# Patient Record
Sex: FEMALE | Race: OTHER | NOT HISPANIC OR LATINO
[De-identification: names, ages, dates, MRNs, and addresses within clinical notes are randomized per-mention and may not be internally consistent; named-entity substitution may affect disease eponyms.]

---

## 2017-02-14 ENCOUNTER — APPOINTMENT (OUTPATIENT)
Dept: OPHTHALMOLOGY | Facility: CLINIC | Age: 82
End: 2017-02-14

## 2017-02-28 ENCOUNTER — APPOINTMENT (OUTPATIENT)
Dept: OPHTHALMOLOGY | Facility: CLINIC | Age: 82
End: 2017-02-28

## 2017-02-28 PROBLEM — Z94.7 HISTORY OF CORNEAL TRANSPLANT: Status: ACTIVE | Noted: 2017-02-28

## 2017-06-29 ENCOUNTER — APPOINTMENT (OUTPATIENT)
Dept: OPHTHALMOLOGY | Facility: CLINIC | Age: 82
End: 2017-06-29

## 2017-08-22 ENCOUNTER — APPOINTMENT (OUTPATIENT)
Dept: OPHTHALMOLOGY | Facility: CLINIC | Age: 82
End: 2017-08-22
Payer: MEDICARE

## 2017-08-22 PROCEDURE — 92286 ANT SGM IMG I&R SPECLR MIC: CPT

## 2017-08-22 PROCEDURE — 92012 INTRM OPH EXAM EST PATIENT: CPT

## 2017-08-22 PROCEDURE — 92133 CPTRZD OPH DX IMG PST SGM ON: CPT

## 2017-08-22 RX ORDER — NEOMYCIN AND POLYMYXIN B SULFATES AND DEXAMETHASONE 3.5; 10000; 1 MG/G; [IU]/G; MG/G
3.5-10000-0.1 OINTMENT OPHTHALMIC
Qty: 1 | Refills: 5 | Status: ACTIVE | COMMUNITY
Start: 2017-08-22 | End: 1900-01-01

## 2017-12-14 ENCOUNTER — APPOINTMENT (OUTPATIENT)
Dept: OPHTHALMOLOGY | Facility: CLINIC | Age: 82
End: 2017-12-14

## 2018-03-29 ENCOUNTER — APPOINTMENT (OUTPATIENT)
Dept: OPHTHALMOLOGY | Facility: CLINIC | Age: 83
End: 2018-03-29
Payer: MEDICARE

## 2018-03-29 PROCEDURE — 92134 CPTRZ OPH DX IMG PST SGM RTA: CPT

## 2018-03-29 PROCEDURE — 92014 COMPRE OPH EXAM EST PT 1/>: CPT

## 2018-03-29 PROCEDURE — 92286 ANT SGM IMG I&R SPECLR MIC: CPT

## 2018-07-26 ENCOUNTER — APPOINTMENT (OUTPATIENT)
Dept: OPHTHALMOLOGY | Facility: CLINIC | Age: 83
End: 2018-07-26
Payer: MEDICARE

## 2018-07-26 PROCEDURE — ZZZZZ: CPT

## 2018-07-26 PROCEDURE — 92012 INTRM OPH EXAM EST PATIENT: CPT

## 2018-07-26 PROCEDURE — 92083 EXTENDED VISUAL FIELD XM: CPT

## 2018-07-26 PROCEDURE — 92250 FUNDUS PHOTOGRAPHY W/I&R: CPT

## 2018-07-26 PROCEDURE — 92286 ANT SGM IMG I&R SPECLR MIC: CPT

## 2019-02-07 ENCOUNTER — APPOINTMENT (OUTPATIENT)
Dept: OPHTHALMOLOGY | Facility: CLINIC | Age: 84
End: 2019-02-07
Payer: MEDICARE

## 2019-02-07 DIAGNOSIS — H52.4 MYOPIA, BILATERAL: ICD-10-CM

## 2019-02-07 DIAGNOSIS — H52.203 MYOPIA, BILATERAL: ICD-10-CM

## 2019-02-07 DIAGNOSIS — H52.13 MYOPIA, BILATERAL: ICD-10-CM

## 2019-02-07 PROCEDURE — 92134 CPTRZ OPH DX IMG PST SGM RTA: CPT

## 2019-02-07 PROCEDURE — 92015 DETERMINE REFRACTIVE STATE: CPT

## 2019-02-07 PROCEDURE — 92014 COMPRE OPH EXAM EST PT 1/>: CPT

## 2019-02-07 PROCEDURE — 92286 ANT SGM IMG I&R SPECLR MIC: CPT

## 2019-06-13 ENCOUNTER — APPOINTMENT (OUTPATIENT)
Dept: OPHTHALMOLOGY | Facility: CLINIC | Age: 84
End: 2019-06-13
Payer: MEDICARE

## 2019-06-13 DIAGNOSIS — H40.009 PREGLAUCOMA, UNSPECIFIED, UNSPECIFIED EYE: ICD-10-CM

## 2019-06-13 DIAGNOSIS — H18.231 SECONDARY CORNEAL EDEMA, RIGHT EYE: ICD-10-CM

## 2019-06-13 DIAGNOSIS — H35.3131 NONEXUDATIVE AGE-RELATED MACULAR DEGENERATION, BILATERAL, EARLY DRY STAGE: ICD-10-CM

## 2019-06-13 PROCEDURE — 92012 INTRM OPH EXAM EST PATIENT: CPT

## 2025-03-16 ENCOUNTER — INPATIENT (INPATIENT)
Facility: HOSPITAL | Age: 89
LOS: 1 days | Discharge: HOME | End: 2025-03-18
Attending: STUDENT IN AN ORGANIZED HEALTH CARE EDUCATION/TRAINING PROGRAM | Admitting: STUDENT IN AN ORGANIZED HEALTH CARE EDUCATION/TRAINING PROGRAM
Payer: MEDICARE

## 2025-03-16 VITALS
HEIGHT: 59 IN | OXYGEN SATURATION: 95 % | SYSTOLIC BLOOD PRESSURE: 165 MMHG | DIASTOLIC BLOOD PRESSURE: 83 MMHG | TEMPERATURE: 98 F | RESPIRATION RATE: 16 BRPM | WEIGHT: 110.01 LBS | HEART RATE: 59 BPM

## 2025-03-16 DIAGNOSIS — I10 ESSENTIAL (PRIMARY) HYPERTENSION: ICD-10-CM

## 2025-03-16 DIAGNOSIS — Z29.9 ENCOUNTER FOR PROPHYLACTIC MEASURES, UNSPECIFIED: ICD-10-CM

## 2025-03-16 DIAGNOSIS — E78.5 HYPERLIPIDEMIA, UNSPECIFIED: ICD-10-CM

## 2025-03-16 DIAGNOSIS — R29.810 FACIAL WEAKNESS: ICD-10-CM

## 2025-03-16 DIAGNOSIS — R56.9 UNSPECIFIED CONVULSIONS: ICD-10-CM

## 2025-03-16 DIAGNOSIS — R55 SYNCOPE AND COLLAPSE: ICD-10-CM

## 2025-03-16 LAB
ALBUMIN SERPL ELPH-MCNC: 4 G/DL — SIGNIFICANT CHANGE UP (ref 3.3–5)
ALP SERPL-CCNC: 60 U/L — SIGNIFICANT CHANGE UP (ref 40–120)
ALT FLD-CCNC: 20 U/L — SIGNIFICANT CHANGE UP (ref 4–33)
ANION GAP SERPL CALC-SCNC: 12 MMOL/L — SIGNIFICANT CHANGE UP (ref 7–14)
APPEARANCE UR: CLEAR — SIGNIFICANT CHANGE UP
APTT BLD: 30 SEC — SIGNIFICANT CHANGE UP (ref 24.5–35.6)
AST SERPL-CCNC: 37 U/L — HIGH (ref 4–32)
BACTERIA # UR AUTO: NEGATIVE /HPF — SIGNIFICANT CHANGE UP
BASOPHILS # BLD AUTO: 0.03 K/UL — SIGNIFICANT CHANGE UP (ref 0–0.2)
BASOPHILS NFR BLD AUTO: 0.3 % — SIGNIFICANT CHANGE UP (ref 0–2)
BILIRUB SERPL-MCNC: 0.4 MG/DL — SIGNIFICANT CHANGE UP (ref 0.2–1.2)
BILIRUB UR-MCNC: NEGATIVE — SIGNIFICANT CHANGE UP
BUN SERPL-MCNC: 25 MG/DL — HIGH (ref 7–23)
CALCIUM SERPL-MCNC: 10.2 MG/DL — SIGNIFICANT CHANGE UP (ref 8.4–10.5)
CAST: 6 /LPF — HIGH (ref 0–4)
CHLORIDE SERPL-SCNC: 102 MMOL/L — SIGNIFICANT CHANGE UP (ref 98–107)
CO2 SERPL-SCNC: 24 MMOL/L — SIGNIFICANT CHANGE UP (ref 22–31)
COLOR SPEC: YELLOW — SIGNIFICANT CHANGE UP
CREAT SERPL-MCNC: 0.93 MG/DL — SIGNIFICANT CHANGE UP (ref 0.5–1.3)
DIFF PNL FLD: NEGATIVE — SIGNIFICANT CHANGE UP
EGFR: 59 ML/MIN/1.73M2 — LOW
EGFR: 59 ML/MIN/1.73M2 — LOW
EOSINOPHIL # BLD AUTO: 0.02 K/UL — SIGNIFICANT CHANGE UP (ref 0–0.5)
EOSINOPHIL NFR BLD AUTO: 0.2 % — SIGNIFICANT CHANGE UP (ref 0–6)
GLUCOSE SERPL-MCNC: 133 MG/DL — HIGH (ref 70–99)
GLUCOSE UR QL: NEGATIVE MG/DL — SIGNIFICANT CHANGE UP
IANC: 7.5 K/UL — HIGH (ref 1.8–7.4)
IMM GRANULOCYTES NFR BLD AUTO: 0.4 % — SIGNIFICANT CHANGE UP (ref 0–0.9)
INR BLD: 1.03 RATIO — SIGNIFICANT CHANGE UP (ref 0.85–1.16)
KETONES UR-MCNC: NEGATIVE MG/DL — SIGNIFICANT CHANGE UP
LEUKOCYTE ESTERASE UR-ACNC: ABNORMAL
LYMPHOCYTES # BLD AUTO: 0.92 K/UL — LOW (ref 1–3.3)
LYMPHOCYTES # BLD AUTO: 10.2 % — LOW (ref 13–44)
MCHC RBC-ENTMCNC: 27.3 PG — SIGNIFICANT CHANGE UP (ref 27–34)
MCHC RBC-ENTMCNC: 33.4 G/DL — SIGNIFICANT CHANGE UP (ref 32–36)
MCV RBC AUTO: 81.6 FL — SIGNIFICANT CHANGE UP (ref 80–100)
MONOCYTES # BLD AUTO: 0.52 K/UL — SIGNIFICANT CHANGE UP (ref 0–0.9)
MONOCYTES NFR BLD AUTO: 5.8 % — SIGNIFICANT CHANGE UP (ref 2–14)
NEUTROPHILS # BLD AUTO: 7.5 K/UL — HIGH (ref 1.8–7.4)
NEUTROPHILS NFR BLD AUTO: 83.1 % — HIGH (ref 43–77)
NITRITE UR-MCNC: NEGATIVE — SIGNIFICANT CHANGE UP
NRBC # BLD AUTO: 0 K/UL — SIGNIFICANT CHANGE UP (ref 0–0)
NRBC # FLD: 0 K/UL — SIGNIFICANT CHANGE UP (ref 0–0)
PH UR: 6.5 — SIGNIFICANT CHANGE UP (ref 5–8)
PLATELET # BLD AUTO: 203 K/UL — SIGNIFICANT CHANGE UP (ref 150–400)
POTASSIUM SERPL-MCNC: 4.8 MMOL/L — SIGNIFICANT CHANGE UP (ref 3.5–5.3)
POTASSIUM SERPL-SCNC: 4.8 MMOL/L — SIGNIFICANT CHANGE UP (ref 3.5–5.3)
PROT SERPL-MCNC: 7.1 G/DL — SIGNIFICANT CHANGE UP (ref 6–8.3)
PROT UR-MCNC: SIGNIFICANT CHANGE UP MG/DL
PROTHROM AB SERPL-ACNC: 11.9 SEC — SIGNIFICANT CHANGE UP (ref 9.9–13.4)
RBC # BLD: 5.17 M/UL — SIGNIFICANT CHANGE UP (ref 3.8–5.2)
RBC # FLD: 13.6 % — SIGNIFICANT CHANGE UP (ref 10.3–14.5)
RBC CASTS # UR COMP ASSIST: 1 /HPF — SIGNIFICANT CHANGE UP (ref 0–4)
REVIEW: SIGNIFICANT CHANGE UP
SODIUM SERPL-SCNC: 138 MMOL/L — SIGNIFICANT CHANGE UP (ref 135–145)
SP GR SPEC: 1.04 — HIGH (ref 1–1.03)
SQUAMOUS # UR AUTO: 1 /HPF — SIGNIFICANT CHANGE UP (ref 0–5)
TROPONIN T, HIGH SENSITIVITY RESULT: 9 NG/L — SIGNIFICANT CHANGE UP
UROBILINOGEN FLD QL: 1 MG/DL — SIGNIFICANT CHANGE UP (ref 0.2–1)
WBC # BLD: 9.03 K/UL — SIGNIFICANT CHANGE UP (ref 3.8–10.5)
WBC # FLD AUTO: 9.03 K/UL — SIGNIFICANT CHANGE UP (ref 3.8–10.5)
WBC UR QL: 7 /HPF — HIGH (ref 0–5)

## 2025-03-16 PROCEDURE — 70450 CT HEAD/BRAIN W/O DYE: CPT | Mod: 26,XU

## 2025-03-16 PROCEDURE — 70498 CT ANGIOGRAPHY NECK: CPT | Mod: 26

## 2025-03-16 PROCEDURE — 0042T: CPT

## 2025-03-16 PROCEDURE — 99285 EMERGENCY DEPT VISIT HI MDM: CPT

## 2025-03-16 PROCEDURE — 70496 CT ANGIOGRAPHY HEAD: CPT | Mod: 26

## 2025-03-16 PROCEDURE — 71045 X-RAY EXAM CHEST 1 VIEW: CPT | Mod: 26

## 2025-03-16 PROCEDURE — 99223 1ST HOSP IP/OBS HIGH 75: CPT

## 2025-03-16 RX ORDER — MELATONIN 5 MG
3 TABLET ORAL ONCE
Refills: 0 | Status: COMPLETED | OUTPATIENT
Start: 2025-03-16 | End: 2025-03-16

## 2025-03-16 RX ORDER — ROSUVASTATIN CALCIUM 20 MG/1
1 TABLET, FILM COATED ORAL
Refills: 0 | DISCHARGE

## 2025-03-16 RX ORDER — ATORVASTATIN CALCIUM 80 MG/1
40 TABLET, FILM COATED ORAL AT BEDTIME
Refills: 0 | Status: DISCONTINUED | OUTPATIENT
Start: 2025-03-16 | End: 2025-03-16

## 2025-03-16 RX ORDER — ROSUVASTATIN CALCIUM 20 MG/1
40 TABLET, FILM COATED ORAL AT BEDTIME
Refills: 0 | Status: DISCONTINUED | OUTPATIENT
Start: 2025-03-16 | End: 2025-03-18

## 2025-03-16 RX ORDER — ASPIRIN 325 MG
300 TABLET ORAL DAILY
Refills: 0 | Status: DISCONTINUED | OUTPATIENT
Start: 2025-03-16 | End: 2025-03-17

## 2025-03-16 RX ORDER — CYANOCOBALAMIN 1000 UG/ML
0 INJECTION INTRAMUSCULAR; SUBCUTANEOUS
Refills: 0 | DISCHARGE

## 2025-03-16 RX ORDER — CALCIUM CARBONATE/VITAMIN D3 500MG-5MCG
0 TABLET ORAL
Refills: 0 | DISCHARGE

## 2025-03-16 RX ORDER — ENOXAPARIN SODIUM 100 MG/ML
40 INJECTION SUBCUTANEOUS EVERY 24 HOURS
Refills: 0 | Status: DISCONTINUED | OUTPATIENT
Start: 2025-03-16 | End: 2025-03-18

## 2025-03-16 RX ORDER — LOSARTAN POTASSIUM 100 MG/1
100 TABLET, FILM COATED ORAL DAILY
Refills: 0 | Status: DISCONTINUED | OUTPATIENT
Start: 2025-03-16 | End: 2025-03-18

## 2025-03-16 RX ADMIN — ROSUVASTATIN CALCIUM 40 MILLIGRAM(S): 20 TABLET, FILM COATED ORAL at 23:50

## 2025-03-16 RX ADMIN — Medication 3 MILLIGRAM(S): at 23:50

## 2025-03-16 NOTE — H&P ADULT - NSHPLABSRESULTS_GEN_ALL_CORE
LABS:                          14.1   9.03  )-----------( 203      ( 16 Mar 2025 16:18 )             42.2     03-16    138  |  102  |  25[H]  ----------------------------<  133[H]  4.8   |  24  |  0.93    Ca    10.2      16 Mar 2025 16:18    TPro  7.1  /  Alb  4.0  /  TBili  0.4  /  DBili  x   /  AST  37[H]  /  ALT  20  /  AlkPhos  60  03-16    PT/INR - ( 16 Mar 2025 16:18 )   PT: 11.9 sec;   INR: 1.03 ratio         PTT - ( 16 Mar 2025 16:18 )  PTT:30.0 sec

## 2025-03-16 NOTE — STROKE CODE NOTE - IV ALTEPLASE EXCLUSION ABS OTHER
no measurable focal neurological deficit, NIHSS 2 for baseline dementia and getting 2 questions wrong, symptoms most likely due to seizure like episode

## 2025-03-16 NOTE — H&P ADULT - HISTORY OF PRESENT ILLNESS
89F with PMH HTN, HLD, moderate/severe dementia who was  brought in to the ER due to an episode of loss of unresponsiveness transient right-sided facial droop.  The patient was a poor historian collateral history was taken from the daughter and the granddaughter at the bedside.  As per the family she was at her usual state of health around 1 PM today (March 16) then in between 1-1 30 she was washings something in the kitchen while standing, pain her granddaughter went to check on her and found her leaning towards the right side.  Then she started to fall towards the right side and the other family members came in and hold her and put her on the floor, she was not responding to her name calling only making some grunting noises.  She had urinary incontinence at the time, this episode of unresponsiveness lasted for about 5 minutes (?),  There was no shaking of the limbs or any twitching of the muscles the body, no tongue bite.  The family noticed some drooping on the right side of the face which lasted for about 2 to 3 minutes then resolved.  After the episode the patient was lethargic and confused for another 4 to 5 minutes, by the time the EMS came into the house she was back to her baseline. No prior history of seizure-like activity or LOC, no recent hx fall or head trauma. 89F with PMH HTN, HLD, moderate/severe dementia who was  brought in to the ER due to an episode of loss of transient unresponsiveness and right-sided facial droop. History primarily obtained from the daughter and the granddaughter at the bedside who report patient was at her usual state of health around 1 PM today (March 16) when shortly afterwards no later than 1:30PM she was noted to be standing in the kitchen washing something by her granddaughter who found her leaning towards the right side. She started to fall towards the right side and the other family members came in and hold her and put her on the floor - patient did not hit her head or fall completely. She was unable to respond to her name and was noted to be making some grunting noises as well  as an episode of urinary incontinence. The episode of unresponsiveness lasted for about 5 minutes and family denies shaking of the limbs or any twitching of the muscles the body or tongue bite. The family noticed some drooping on the right side of the face which lasted for about 2 to 3 minutes then resolved.  After the episode the patient was lethargic and confused for another 4 to 5 minutes, by the time the EMS came into the house she was back to her baseline. No prior history of seizure-like activity or LOC, no recent hx fall or head trauma.    In the ED code stroke was called and neuro was consulted. CT imaging was negative for acute pathology and patient did not have any further episodes of unresponsiveness or AMS. Labs unremarkable, trop 59, CXR, UA clear, VS notable for hypertension 158/90

## 2025-03-16 NOTE — CONSULT NOTE ADULT - SUBJECTIVE AND OBJECTIVE BOX
Neurology - Consult Note    -  Spectra: [id number] ([facility]), [id number] ([facility])  -    HPI: Patient BAKARI GAUTHIER is a 89y (1935) wo/man with a PMHx significant for ***      Review of Systems:  INCOMPLETE   CONSTITUTIONAL: No fevers or chills  EYES AND ENT: No visual changes or no throat pain   NECK: No pain or stiffness  RESPIRATORY: No hemoptysis or shortness of breath  CARDIOVASCULAR: No chest pain or palpitations  GASTROINTESTINAL: No melena or hematochezia  GENITOURINARY: No dysuria or hematuria  NEUROLOGICAL: +As stated in HPI above  SKIN: No itching, burning, rashes, or lesions   All other review of systems is negative unless indicated above.    Allergies:  No Known Allergies      PMHx/PSHx/Family Hx: As above, otherwise see below   HLD (Hyperlipidemia)    HTN - Hypertension        Social Hx:  No current use of tobacco, alcohol, or illicit drugs  Lives with ***    Medications:  MEDICATIONS  (STANDING):    MEDICATIONS  (PRN):      Vitals:  T(C): 36.6 (03-16-25 @ 15:37), Max: 36.6 (03-16-25 @ 15:37)  HR: 59 (03-16-25 @ 15:37) (59 - 59)  BP: 165/83 (03-16-25 @ 15:37) (165/83 - 165/83)  RR: 16 (03-16-25 @ 15:37) (16 - 16)  SpO2: 95% (03-16-25 @ 15:37) (95% - 95%)    Physical Examination: INCOMPLETE  General - NAD, pleasant, cooperative   Cardiovascular - Peripheral pulses palpable, no edema  Neurologic Exam:  Mental status - Awake, Alert, Oriented to person, place, and time. Speech fluent, repetition and naming intact. Follows simple and complex commands. Attention/concentration, recent and remote memory (including registration and recall), and fund of knowledge intact    Cranial nerves:  CN II: Visual fields are full to confrontation. Fundoscopic exam not done. Pupils are 4 mm and briskly reactive to light.   CN III, IV, VI: EOMI, no nystagmus, no ptosis  CN V: Facial sensation is intact to light touch in all 3 divisions bilaterally.  CN VII: Face is symmetric with normal eye closure and smile.  CN VII: Hearing is normal to rubbing fingers  CN IX, X: Palate elevates symmetrically. Phonation is normal.  CN XI: Head turning and shoulder shrug are intact  CN XII: Tongue is midline with normal movements and no atrophy.    Motor - Normal bulk and tone throughout. No pronator drift of out-stretched arms.  Strength testing          R/L:   Deltoid(C5) 5/5  Biceps(C6) 5/5   Triceps(C7) 5/5    Wrist Extension 5/5   Wrist Flexion (C8) 5/5    Interossei  (T1) 5/5     5/5                      R/L: Hip Flexion(L2/3) 5/5   Hip Extension (L4/5) 5/5  Knee Flexion (L4/5/S1) 5/5   Knee Extension (L3/4) 5/5  Dorsiflexion (L4/5) 5/5  Plantar Flexion (S1) 5/5  Sensation - Light touch,  pain, vibration and position sense intact throughout    DTR's -             Biceps      Triceps     Brachioradialis      Patellar    Ankle    Toes/plantar response  R             2+             2+                  2+                       2+            2+                 Down  L              2+             2+                 2+                        2+           2+                 Down    Coordination - Rapid alternating movements and fine finger movements are intact. There is no dysmetria on finger-to-nose and heel-knee-shin. There are no abnormal or extraneous movements. Romberg?    Gait and station - Posture is normal. Gait is steady with normal steps, base, arm swing, and turning. Heel and toe walking are normal. Tandem gait is normal       Labs:                        14.1   9.03  )-----------( 203      ( 16 Mar 2025 16:18 )             42.2           CAPILLARY BLOOD GLUCOSE      POCT Blood Glucose.: 125 mg/dL (16 Mar 2025 15:47)        PT/INR - ( 16 Mar 2025 16:18 )   PT: 11.9 sec;   INR: 1.03 ratio         PTT - ( 16 Mar 2025 16:18 )  PTT:30.0 sec  CSF:                  Radiology:     Neurology - Consult Note    -  Spectra: [phone] ([facility]), [phone] ([facility])  -    HPI: Patient BKAARI GAUTHIER is a 89y (1935) right handed woman with a PMHx significant for HTN, HLD, moderate to severe dementia was brought in to the ER due to an episode of loss of unresponsiveness transient right-sided facial droop.  The patient was a poor historian collateral history was taken from the daughter and the granddaughter at the bedside.  As per the family she was at her usual state of health around 1 PM today (March 16) then in between 1-1 30 she was washings something in the kitchen while standing, pain her granddaughter went to check on her and found her leaning towards the right side.  Then she started to fall towards the right side and the other family members came in and hold her and put her on the floor, she was not responding to her name calling only making some grunting noises.  She had urinary incontinence at the time, this episode of unresponsiveness lasted for about 5 minutes (?),  There was no shaking of the limbs or any twitching of the muscles the body, no tongue bite.  The family noticed some drooping on the right side of the face which lasted for about 2 to 3 minutes then resolved.  After the episode the patient was lethargic and confused for another 4 to 5 minutes, by the time the EMS came into the house she was back to her baseline.  By the time the patient was in the ER she was back to her baseline without any new episodes of unresponsiveness.  The code stroke was called because of the reported altered mental status and right-sided facial droop.  The family denied any previous history of convulsion or any seizure-like activities are loss of consciousness.  Family denied any recent history of fall or head trauma.  There was no history of fever, chest pain, cough, shortness of breath, or headache.      Review of Systems:  completed with the help of family members at bedside   CONSTITUTIONAL: No fevers or chills  EYES AND ENT: No visual changes or no throat pain   NECK: No pain or stiffness  RESPIRATORY: No hemoptysis or shortness of breath  CARDIOVASCULAR: No chest pain or palpitations  GASTROINTESTINAL: No melena or hematochezia  GENITOURINARY: No dysuria or hematuria  NEUROLOGICAL: +As stated in HPI above  SKIN: No itching, burning, rashes, or lesions   All other review of systems is negative unless indicated above.    Allergies:  No Known Allergies      PMHx/PSHx/Family Hx: As above, otherwise see below   HLD (Hyperlipidemia)    HTN - Hypertension        Social Hx:  No current use of tobacco, alcohol, or illicit drugs  Lives with ***    Medications:  MEDICATIONS  (STANDING):    MEDICATIONS  (PRN):      Vitals:  T(C): 36.6 (03-16-25 @ 15:37), Max: 36.6 (03-16-25 @ 15:37)  HR: 59 (03-16-25 @ 15:37) (59 - 59)  BP: 165/83 (03-16-25 @ 15:37) (165/83 - 165/83)  RR: 16 (03-16-25 @ 15:37) (16 - 16)  SpO2: 95% (03-16-25 @ 15:37) (95% - 95%)    Physical Examination:  General - NAD, pleasant, cooperative   Cardiovascular - Peripheral pulses palpable, no edema  Neurologic Exam:  Mental status - Awake, Alert, Oriented to person, was able to tell she is in the hospital, unable to tell the date, month or year. Speech fluent, repetition and naming intact. Follows simple commands but unable to follow complex commands. Attention/concentration, recent and remote memory (including registration and recall), and fund of knowledge impaired    Cranial nerves:  CN II: Visual fields are full to confrontation. Fundoscopic exam not done. Pupils are 2 mm and sluggishly reactive to light.   CN III, IV, VI: EOMI, no nystagmus, no ptosis  CN V: Facial sensation is intact to light touch in all 3 divisions bilaterally.  CN VII: Face is symmetric with normal eye closure and smile.  CN VII: Hearing is normal to rubbing fingers  CN IX, X: Palate elevates symmetrically. Phonation is normal.  CN XI: Head turning and shoulder shrug are intact  CN XII: Tongue is midline with normal movements and no atrophy.    Motor - Normal bulk and tone throughout. No pronator drift of out-stretched arms.    Strength testing          Grossly 5/5 throughout, unable to assess fully due to inability to follow command and exert full strength    Sensation - Light touch intact throughout    DTR's -             Biceps      Triceps     Brachioradialis      Patellar    Ankle    Toes/plantar response  R         2+             2+                  2+              2+Brisk        2+                 Down  L          2+             2+                 2+              2+Brisk        2+                 Down    Coordination - Rapid alternating movements and fine finger movements are intact. There is no dysmetria on finger-to-nose but heel-knee-shin could not be assessed properly due to dementia. There are no abnormal or extraneous movements.   Romberg- not tested due to acute setup and safety concern    Gait and station - not tested due to acute setup and safety concern    Labs:                        14.1   9.03  )-----------( 203      ( 16 Mar 2025 16:18 )             42.2           CAPILLARY BLOOD GLUCOSE      POCT Blood Glucose.: 125 mg/dL (16 Mar 2025 15:47)        PT/INR - ( 16 Mar 2025 16:18 )   PT: 11.9 sec;   INR: 1.03 ratio         PTT - ( 16 Mar 2025 16:18 )  PTT:30.0 sec  CSF:            Radiology:  CTH:  No acute intracranial hemorrhage, mass effect, or midline shift.    CT PERFUSION:  Technical limitations: Perfusion scan was not diagnostic.    CTA NECK:  No evidence ofsignificant stenosis or occlusion.    CTA HEAD:  No large vessel occlusion, significant stenosis or vascular abnormality   identified.

## 2025-03-16 NOTE — ED ADULT TRIAGE NOTE - CHIEF COMPLAINT QUOTE
At 1:30 PM, patient washing fish in kitchen. Family went to check on her and noted patient "tilted" to right side like she was going to fall, so they brought patient down to ground safely. Family noted slurred speech and right facial droop, episode of incontinence. 911 was called and patient refused EMS. History HTN. MD Benjie called for stroke evaluation. Stroke code called.

## 2025-03-16 NOTE — ED PROVIDER NOTE - OBJECTIVE STATEMENT
89-year-old female PMH of dementia, HTN and HLD presents with AMS and strokelike symptoms beginning at 1 PM.  Family notes that she was standing at the counter cleaning fish when she started to.  That she was not acting like herself and she started falling to her right.  Family caught her and gently lowered her to the ground and for 5 minutes she was unresponsive.  She then had a 5-minute period where she appeared confused and had garbled speech and R facial droop. 5 minutes later she was back to her baseline.  She did lose control of her bladder during this time.  Family denies seeing any tremors or shakes.

## 2025-03-16 NOTE — H&P ADULT - PROBLEM SELECTOR PLAN 3
-pt with hx moderate/severe dementia  -was evaluated with EEG by neurologist Dr. Huff in 2023 and was recommended donepezil but was self-dc/d by family  -aspiration and fall precautions  -imaging as above, if negative can follow up with neuro outpatient.

## 2025-03-16 NOTE — H&P ADULT - NSHPPHYSICALEXAM_GEN_ALL_CORE
VITALS:   T(C): 36.4 (03-16-25 @ 19:00), Max: 36.6 (03-16-25 @ 15:37)  HR: 67 (03-16-25 @ 19:00) (59 - 91)  BP: 158/90 (03-16-25 @ 19:00) (158/90 - 190/135)  RR: 16 (03-16-25 @ 19:00) (16 - 20)  SpO2: 100% (03-16-25 @ 19:00) (95% - 100%)    GENERAL: NAD, lying in bed comfortably  HEAD:  Atraumatic, normocephalic  EYES: EOMI, PERRLA, conjunctiva and sclera clear  ENT: Moist mucous membranes  NECK: Supple, no JVD  HEART: Regular rate and rhythm, no murmurs, rubs, or gallops  LUNGS: Unlabored respirations.  Clear to auscultation bilaterally, no crackles, wheezing, or rhonchi  ABDOMEN: Soft, nontender, nondistended, +BS  EXTREMITIES: 2+ peripheral pulses bilaterally. No clubbing, cyanosis, or edema  NERVOUS SYSTEM:  A&Ox3, no focal deficits   SKIN: No rashes or lesions VITALS:   T(C): 36.4 (03-16-25 @ 19:00), Max: 36.6 (03-16-25 @ 15:37)  HR: 67 (03-16-25 @ 19:00) (59 - 91)  BP: 158/90 (03-16-25 @ 19:00) (158/90 - 190/135)  RR: 16 (03-16-25 @ 19:00) (16 - 20)  SpO2: 100% (03-16-25 @ 19:00) (95% - 100%)    GENERAL: NAD, lying in bed comfortably  HEAD:  Atraumatic, normocephalic  EYES: EOMI, PERRLA, conjunctiva and sclera clear  ENT: Moist mucous membranes  NECK: Supple, no JVD  HEART: Regular rate and rhythm, no murmurs, rubs, or gallops  LUNGS: Unlabored respirations.  Clear to auscultation bilaterally, no crackles, wheezing, or rhonchi  ABDOMEN: Soft, nontender, nondistended, +BS  EXTREMITIES: 2+ peripheral pulses bilaterally. No clubbing, cyanosis, or edema  NERVOUS SYSTEM:  A&Ox3, 4/5 strength in RUE/RLE, sensory fxn intact in all extremities. Per family patient has good long term memory and functional status but has difficulty with short term memory.  SKIN: No rashes or lesions

## 2025-03-16 NOTE — ED PROVIDER NOTE - CLINICAL SUMMARY MEDICAL DECISION MAKING FREE TEXT BOX
89-year-old female PMH of dementia, HTN and HLD presents with AMS and strokelike symptoms beginning at 1 PM where she started falling to her right.  Family caught her and gently lowered her to the ground and for 5 minutes she was unresponsive.  She then had a 5-minute period where she appeared confused and had garbled speech and R facial droop. 5 minutes later she was back to her baseline.  She did lose control of her bladder during this time.  Family denies seeing any tremors or shakes. Vitals are wnl. PE is unremarkable. Stoke code CT was negative for bleed or large occlusion. Neurology at bedside is suspicious for first time seizure and recommend admission for EEG/MRI. Will also get labs, cardiac work-up and UA to look for any underlying metabolic/infectious causes.

## 2025-03-16 NOTE — ED PROVIDER NOTE - PHYSICAL EXAMINATION
Const: Awake, alert, no acute distress.    HEENT: NC/AT.  Moist mucous membranes.  Eyes: Extraocular movements intact b/l.  No scleral icterus.  Neck: Full ROM without pain.  Cardiac: Extremities well perfused, normal coloration, no peripheral cyanosis.    Resp: Speaking in full sentences.  No evidence of respiratory distress.  Abd: Non-distended.  Skin: Normal coloration.  No rashes, abrasions or lacerations.  Neuro: Awake, alert & oriented x 2 (at baseline):.  Moves all extremities symmetrically.  No obvious focal deficits. No dysmetria. No pronator drift. CN2-12 grossly intact.

## 2025-03-16 NOTE — ED ADULT NURSE NOTE - OBJECTIVE STATEMENT
89 year old female brought to ED by family c/o weakness while performing ADLs. Family reports pt was leaning to the right side slurring her speech, and sat down, family lowered her to the floor and was unresponsive for 4-5 minutes. Upon arousal, family states pt was lethargic. Family states when EMS arrived, pt was at baseline. Family states pt has a past medical hx of dementia. Pt denies headache, hitting her head, use of blood thinners, nausea, dizziness, urinary symptoms, or any pain at this time.    AxOx3 and ambulatory x 1 at baseline. RR even and unlabored on room air. Lung sounds clear to auscultation in all fields. S1 and S2 noted, rate and rhythm regular, pt on cardiac monitor, NSR. Abdomen soft, nondistended, and nontender. Normoactive bowel sounds heard in all 4 quadrants. Safety maintained. 20G IV placed in right AC.     AxOx3 and ambulatory x 1 assist at baseline. Neurologically intact. Pt speaking in full sentences. RR even and unlabored on room air. Lung sounds clear to auscultation in all lung fields. S1 and S2 noted, rate and rhythm regular, pt on cardiac monitor, NSR. Abdomen soft, nondistended, and nontender, normoactive bowel sounds heard in all 4 quadrants. 20G IV placed in right forearm. Safety maintained.

## 2025-03-16 NOTE — H&P ADULT - NSHPSOCIALHISTORY_GEN_ALL_CORE
Denies smoking/alcohol use. Ambulatory independently while in house, uses cane while outside. Lives with family at home.

## 2025-03-16 NOTE — ED ADULT NURSE REASSESSMENT NOTE - NS ED NURSE REASSESS COMMENT FT1
Received report from Alondra BENTON. Pt awake and alert, resp even and unlabored. Pt in no acute distress at this time. Pt NSR on CM. Seizure precaution in place and suction set up. NIH score of 1. Pt has no deficits at this time .Denies CP, SOB, HA, dizziness, palpitations, blurry vision. Resting comfortably. Safety being maintained, plan of care ongoing.

## 2025-03-16 NOTE — ED PROVIDER NOTE - WR ORDER ID 1
depression and bipolar disorder, 7 years ago without any beneficial effect  Mental health contact: Psychiatrist in [facility] manages patient's psychotropic medications  Head trauma: Denies    Allergies:  Dye [gadolinium derivatives]    Mental Status  Appearance: Appropriately groomed and in hospital attire. Made good eye contact.   Behavior: Slightly anxious, cooperative with interview, socially appropriate.  No psychomotor retardation or agitation appreciated.  Gait was not evaluated, as patient was sitting on his bed.   Speech: Normal in tone, volume, and quality.  No pressured speech noted  Mood: \"Anxious\"   Affect: Mood congruent. Range is full  Thought Process: Appears linear and goal oriented.   Thought Content: Patient does not have any current active suicidal and homicidal ideations. No overt delusions or paranoia appreciated.   Perceptions: Seems patient does not have any auditory or visual hallucinations at present time. Patient did not appear to be responding to internal stimuli. No overt psychosis.   Executive Functions: Appear mildly impaired.   Concentration: Seems fair  Reasoning: Appears impaired based on interaction from interview   Orientation: to person, place, date, and situation.   Alert.   Language: Intact.   Fund of information: Intact.   Memory: recent and remote appear intact.   Impulsivity: Questionable  Neurovegitative: Good appetite, fair sleep  Insight: Limited  Judgment: Limited    Assessment    DSM 5 DIAGNOSIS:  Mood disorder, unspecified  Anxiety, unspecified  History of depression  History of bipolar depression  Status post unintentional overdose on prescribed medication    Medical conditions pertinent to the patient's mental health  Coronary artery disease  Hyperlipidemia  Hypertension  Obesity  COPD  Sleep apnea  Chronic pain   Irritable bowel syndrome    Recommendations  1. Currently patient is not suicidal, homicidal or psychotic.  Patient adamantly denies intentional 
[id number]

## 2025-03-16 NOTE — ED ADULT TRIAGE NOTE - AVIAN FLU SYMPTOMS
[FreeTextEntry1] : 58M 4mos s/p L TKA  Continue HEP Activities as tolerated return 1 year   We discussed the patient's progress and they were reminded of their antibiotic prophylaxis. We discussed continued physical therapy and/or a home exercise program. Questions about their knee replacement and future follow up were answered and discussed.    
No

## 2025-03-16 NOTE — CONSULT NOTE ADULT - ATTENDING COMMENTS
DOS 3/17/25    89yo F with PMHx significant for HTN, HLD, moderate to severe dementia was brought in to the ER due to an episode of loss of unresponsiveness transient right-sided facial droop, found to be leaning to her right, along with nonsensical speech and urinary incontinence.   Strkoe code called on ED evaluation  Not tnk candidate as at baseline per ED eval  No LVO on CTA  Imaging without notable findings  Currently undergoing eeg with daughter at bedside, AAOx1, no other clear focality    Syncope vs first time seizure  f/u eeg  b12, folate, tsh all wnl   no need for stroke workup at tis time  tele  check tte for syncope  was on donepezil at home but stopped   has baseline severe dementia - will anticipate dc if eeg negative

## 2025-03-16 NOTE — CONSULT NOTE ADULT - ASSESSMENT
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%), Small vessel disease, Embolic stroke of undetermined source, Stroke of other determined etiology    Recommendations:  [] Atorvastatin 40-80 mg daily titrated per LDL target based on ASCVD score  [] HgbA1C, fasting lipid panel, CBC, CMP, coag panel, troponin  [] check UA, TSH, T3/T4, vitamin B1, B6, B12, folate, lactate, creatnine kinase  [] MRI brain w/o con,   [] TTE w/o bubble study  [] telemetry to check for arrhythmia, EKG, will discuss loop recorder    - Tight glucose control (long-term goal HgbA1c < 6%)  - Stroke education and counseling  - Neuro-checks and VS q4h  - BP target normotensive, <130/80 mm Hg  - Dysphagia screen. If fails, speech/swallow eval  - aspiration, fall precautions  - STAT CT head non-contrast for change in neuro exam.   - PT/ OT / DVT ppx per primary team     Discussed with stroke  attending Dr. Deirdre Aguilar regarding decision against candidacy for Tenecteplase / thrombectomy. Will be formally staffed on morning rounds with attending. Recommendations will be complete once signed by attending. Assessment:  89yo F with PMHx significant for HTN, HLD, moderate to severe dementia was brought in to the ER due to an episode of loss of unresponsiveness transient right-sided facial droop.  The patient was a poor historian collateral history was taken from the daughter and the granddaughter at the bedside.  As per the family she was at her usual state of health around 1 PM today (March 16) then in between 1-1 30 she was washings something in the kitchen while standing, pain her granddaughter went to check on her and found her leaning towards the right side.  Then she started to fall towards the right side and the other family members came in and hold her and put her on the floor, she was not responding to her name calling only making some grunting noises.  She had urinary incontinence during that episode. This episode of unresponsiveness lasted for about 5 minutes (?),  There was no shaking of the limbs or any twitching of the muscles the body, no tongue bite.  Exam grossly non-focal, CT head and CTA head/neck unremarkable.   LKW: 1300 on 03/16/2025  NIHSS: 2 (gettign 2 questions wrong)  Baseline MRS: 2  Not a Teneceteplase candidate due to no measurable focal neurological deficit, NIHSS 2 due to baseline dementia, symptoms suggestive of a seizure like episode   Not a thrombectomy candidate due to no LVO    Impression: Episode of unresponsiveness with right sided facial droop and urinary incontinence followed by period of lethargy and confusion could be due to seizure on the background of moderate to severe dementia. Unlikely to be vascular in origin given non-focal exam, and unremarkable CT head and CTA head/neck. Needs to r/o toxic/metabolic encephalopathy as precipitating factor for the seizure like episode.     Recommendations:  [] Atorvastatin 40-80 mg daily titrated per LDL target based on ASCVD score  [] HgbA1C, fasting lipid panel, CBC, CMP, coag panel, troponin  [] check UA, TSH, T3/T4, vitamin B1, B6, B12, folate, lactate, creatnine kinase  [] MRI brain w/wo IV contrast   [] TTE w/o bubble study  [] telemetry to check for arrhythmia, EKG, will discuss loop recorder    - Tight glucose control (long-term goal HgbA1c < 6%)  - Stroke education and counseling  - Neuro-checks and VS q4h  - BP target normotensive, <130/80 mm Hg  - Dysphagia screen. If fails, speech/swallow eval  - aspiration, fall precautions  - STAT CT head non-contrast for change in neuro exam.   - PT/ OT / DVT ppx per primary team     Discussed with stroke  attending Dr. Deirdre Aguilar regarding decision against candidacy for Tenecteplase / thrombectomy. Will be formally staffed on morning rounds with attending. Recommendations will be complete once signed by attending. Assessment:  89yo F with PMHx significant for HTN, HLD, moderate to severe dementia was brought in to the ER due to an episode of loss of unresponsiveness transient right-sided facial droop.  The patient was a poor historian collateral history was taken from the daughter and the granddaughter at the bedside.  As per the family she was at her usual state of health around 1 PM today (March 16) then in between 1-1 30 she was washings something in the kitchen while standing, pain her granddaughter went to check on her and found her leaning towards the right side.  Then she started to fall towards the right side and the other family members came in and hold her and put her on the floor, she was not responding to her name calling only making some grunting noises.  She had urinary incontinence during that episode. This episode of unresponsiveness lasted for about 5 minutes (?),  There was no shaking of the limbs or any twitching of the muscles the body, no tongue bite.  Exam grossly non-focal, CT head and CTA head/neck unremarkable.   LKW: 1300 on 03/16/2025  NIHSS: 2 (gettign 2 questions wrong)  Baseline MRS: 2  Not a Teneceteplase candidate due to no measurable focal neurological deficit, NIHSS 2 due to baseline dementia, symptoms suggestive of a seizure like episode   Not a thrombectomy candidate due to no LVO    Impression: Episode of unresponsiveness with right sided facial droop and urinary incontinence followed by period of lethargy and confusion could be due to seizure on the background of moderate to severe dementia. Unlikely to be vascular in origin given non-focal exam, and unremarkable CT head and CTA head/neck. Needs to r/o toxic/metabolic encephalopathy as precipitating factor for the seizure like episode.     Recommendations:  [] Atorvastatin 40-80 mg daily titrated per LDL target based on ASCVD score  [] HgbA1C, fasting lipid panel, CBC, CMP, coag panel, troponin  [] check UA, TSH, T3/T4, vitamin B1, B6, B12, folate, lactate, creatnine kinase  [] MRI brain w/wo IV contrast   [] rEEG  [] TTE w/o bubble study  [] telemetry to check for arrhythmia, EKG, will discuss loop recorder    - Tight glucose control (long-term goal HgbA1c < 6%)  - Stroke education and counseling  - Neuro-checks and VS q4h  - BP target normotensive, <130/80 mm Hg  - Dysphagia screen. If fails, speech/swallow eval  - aspiration, fall precautions  - STAT CT head non-contrast for change in neuro exam.   - PT/ OT / DVT ppx per primary team     Discussed with stroke  attending Dr. Deirdre Aguilar regarding decision against candidacy for Tenecteplase / thrombectomy. Will be formally staffed on morning rounds with attending. Recommendations will be complete once signed by attending.

## 2025-03-16 NOTE — H&P ADULT - PROBLEM SELECTOR PLAN 2
-pt with R sided facial droop that resolved within 5 minutes after onset as per family  -pt. w/ R sided weakness 4/5 of UE/LE  -PT/OT consult ordered  -imaging as above

## 2025-03-16 NOTE — H&P ADULT - ASSESSMENT
89F with PMH HTN, HLD, moderate/severe dementia who was  brought in to the ER due to an episode of loss of unresponsiveness transient right-sided facial droop now admitted for CVA workup

## 2025-03-16 NOTE — ED PROVIDER NOTE - ATTENDING CONTRIBUTION TO CARE
I have discussed the patient's case presentation with resident. I have also personally performed a face-to-face evaluation of the patient. I agree with the resident's assessment and plan.    Stroke code activated by me from triage for transient dysarthria, possible LOC. DDx CVA vs seizure. CTA head/neck negative for stroke; neurology feels sx more likely seizure

## 2025-03-16 NOTE — H&P ADULT - PROBLEM SELECTOR PLAN 1
-pt noted to have transient unresponsiveness at home w/ R sided facial droop that resolved within appx. 5 minutes.  -no seizure like activity or tongue biting noted, patient noted to return to baseline upon EMS arrival to [place]  -[facility] [place] called in ED, neuro consulted, CT imaging negative and labs/UA/CXR unremarkable  -will give atorvastatin and obtain further labs as per neuro  -TTE and MR brain w/wo contrast  -fall precautions, PT/OT/DVT ppx  -dysphagia screen

## 2025-03-16 NOTE — ED ADULT NURSE NOTE - NSFALLHARMRISKINTERV_ED_ALL_ED
Assistance OOB with selected safe patient handling equipment if applicable/Assistance with ambulation/Communicate risk of Fall with Harm to all staff, patient, and family/Encourage patient to sit up slowly, dangle for a short time, stand at bedside before walking/Monitor gait and stability/Orthostatic vital signs/Provide visual cue: red socks, yellow wristband, yellow gown, etc/Reinforce activity limits and safety measures with patient and family/Bed in lowest position, wheels locked, appropriate side rails in place/Call bell, personal items and telephone in reach/Instruct patient to call for assistance before getting out of bed/chair/stretcher/Non-slip footwear applied when patient is off stretcher/[place] to call system/Physically safe environment - no spills, clutter or unnecessary equipment/Purposeful Proactive Rounding/Room/bathroom lighting operational, light cord in reach

## 2025-03-17 LAB
ANION GAP SERPL CALC-SCNC: 14 MMOL/L — SIGNIFICANT CHANGE UP (ref 7–14)
BUN SERPL-MCNC: 23 MG/DL — SIGNIFICANT CHANGE UP (ref 7–23)
CALCIUM SERPL-MCNC: 9.6 MG/DL — SIGNIFICANT CHANGE UP (ref 8.4–10.5)
CHLORIDE SERPL-SCNC: 101 MMOL/L — SIGNIFICANT CHANGE UP (ref 98–107)
CHOLEST SERPL-MCNC: 134 MG/DL — SIGNIFICANT CHANGE UP
CK SERPL-CCNC: 116 U/L — SIGNIFICANT CHANGE UP (ref 25–170)
CO2 SERPL-SCNC: 20 MMOL/L — LOW (ref 22–31)
CREAT SERPL-MCNC: 0.85 MG/DL — SIGNIFICANT CHANGE UP (ref 0.5–1.3)
CULTURE RESULTS: SIGNIFICANT CHANGE UP
EGFR: 66 ML/MIN/1.73M2 — SIGNIFICANT CHANGE UP
EGFR: 66 ML/MIN/1.73M2 — SIGNIFICANT CHANGE UP
GLUCOSE SERPL-MCNC: 91 MG/DL — SIGNIFICANT CHANGE UP (ref 70–99)
HDLC SERPL-MCNC: 65 MG/DL — SIGNIFICANT CHANGE UP
INR BLD: 0.97 RATIO — SIGNIFICANT CHANGE UP (ref 0.85–1.16)
LACTATE SERPL-SCNC: 1.2 MMOL/L — SIGNIFICANT CHANGE UP (ref 0.5–2)
LIPID PNL WITH DIRECT LDL SERPL: 53 MG/DL — SIGNIFICANT CHANGE UP
NON HDL CHOLESTEROL: 69 MG/DL — SIGNIFICANT CHANGE UP
POTASSIUM SERPL-MCNC: 5.3 MMOL/L — SIGNIFICANT CHANGE UP (ref 3.5–5.3)
POTASSIUM SERPL-SCNC: 5.3 MMOL/L — SIGNIFICANT CHANGE UP (ref 3.5–5.3)
SODIUM SERPL-SCNC: 135 MMOL/L — SIGNIFICANT CHANGE UP (ref 135–145)
SPECIMEN SOURCE: SIGNIFICANT CHANGE UP
T3 SERPL-MCNC: 96 NG/DL — SIGNIFICANT CHANGE UP (ref 80–200)
T4 AB SER-ACNC: 9.73 UG/DL — SIGNIFICANT CHANGE UP (ref 5.1–13)
TRIGL SERPL-MCNC: 83 MG/DL — SIGNIFICANT CHANGE UP
TSH SERPL-MCNC: 4.31 UIU/ML — HIGH (ref 0.27–4.2)
VIT B12 SERPL-MCNC: 1579 PG/ML — HIGH (ref 200–900)

## 2025-03-17 PROCEDURE — 99233 SBSQ HOSP IP/OBS HIGH 50: CPT

## 2025-03-17 PROCEDURE — 95720 EEG PHY/QHP EA INCR W/VEEG: CPT

## 2025-03-17 RX ORDER — MELATONIN 5 MG
3 TABLET ORAL ONCE
Refills: 0 | Status: COMPLETED | OUTPATIENT
Start: 2025-03-17 | End: 2025-03-17

## 2025-03-17 RX ORDER — ASPIRIN 325 MG
81 TABLET ORAL DAILY
Refills: 0 | Status: DISCONTINUED | OUTPATIENT
Start: 2025-03-17 | End: 2025-03-18

## 2025-03-17 RX ADMIN — ENOXAPARIN SODIUM 40 MILLIGRAM(S): 100 INJECTION SUBCUTANEOUS at 11:05

## 2025-03-17 RX ADMIN — Medication 3 MILLIGRAM(S): at 22:59

## 2025-03-17 RX ADMIN — LOSARTAN POTASSIUM 100 MILLIGRAM(S): 100 TABLET, FILM COATED ORAL at 06:42

## 2025-03-17 RX ADMIN — ROSUVASTATIN CALCIUM 40 MILLIGRAM(S): 20 TABLET, FILM COATED ORAL at 22:59

## 2025-03-17 RX ADMIN — Medication 81 MILLIGRAM(S): at 11:05

## 2025-03-17 NOTE — OCCUPATIONAL THERAPY INITIAL EVALUATION ADULT - NSOTDISCHREC_GEN_A_CORE
Recommend supervision/assist with functional activities which pt's daughter reports family can provide./No skilled OT needs

## 2025-03-17 NOTE — PROGRESS NOTE ADULT - PROBLEM SELECTOR PLAN 3
-pt with hx moderate/severe dementia  -was evaluated with EEG by neurologist Dr. Huff in 2023 and was recommended donepezil but was self-dc/d by family  -aspiration and fall precautions  -imaging as above, if negative can follow up with neuro outpatient. BP stable  - continue losartan

## 2025-03-17 NOTE — OCCUPATIONAL THERAPY INITIAL EVALUATION ADULT - GENERAL OBSERVATIONS, REHAB EVAL
Pt found supine in bed (+) tele in NAD, all lines intact (+) daughter present at bedside. Pt OK to be seen for OT per SERENITY Smith. HR 67 BPM.

## 2025-03-17 NOTE — PATIENT PROFILE ADULT - FALL HARM RISK - HARM RISK INTERVENTIONS
Assistance with ambulation/Assistance OOB with selected safe patient handling equipment/Communicate Risk of Fall with Harm to all staff/Monitor for mental status changes/Monitor gait and stability/Move patient closer to nurses' station/Reinforce activity limits and safety measures with patient and family/Reorient to person, place and time as needed/Review medications for side effects contributing to fall risk/Sit up slowly, dangle for a short time, stand at bedside before walking/Tailored Fall Risk Interventions/Toileting schedule using arm’s reach rule for commode and bathroom/Use of alarms - bed, chair and/or voice tab/Visual Cue: Yellow wristband and red socks/Bed in lowest position, wheels locked, appropriate side rails in place/Call bell, personal items and telephone in reach/Instruct patient to call for assistance before getting out of bed or chair/Non-slip footwear when patient is out of bed/[place] to call system/Physically safe environment - no spills, clutter or unnecessary equipment/Purposeful Proactive Rounding/Room/bathroom lighting operational, light cord in reach

## 2025-03-17 NOTE — PHYSICAL THERAPY INITIAL EVALUATION ADULT - GENERAL OBSERVATIONS, REHAB EVAL
Pt encountered in semisupine position, no distress, AxOx2, with +IV, +cardiac monitor, and daughter at bedside. Pt agreeable to participate in PT evaluation. Heart rate: 73bpm.

## 2025-03-17 NOTE — OCCUPATIONAL THERAPY INITIAL EVALUATION ADULT - PATIENT/FAMILY/SIGNIFICANT OTHER GOALS STATEMENT, OT EVAL
Pt with impaired cognitive status, did not state goal. Pt's daughter wants pt to feel better and improve balance.

## 2025-03-17 NOTE — PROGRESS NOTE ADULT - PROBLEM SELECTOR PLAN 2
-pt with R sided facial droop that resolved within 5 minutes after onset as per family  -pt. w/ R sided weakness 4/5 of UE/LE  -PT/OT consult ordered  -imaging as above moderate to severe  - previously on donepezil but self discontiued   - outpatient followup

## 2025-03-17 NOTE — PHYSICAL THERAPY INITIAL EVALUATION ADULT - PATIENT PROFILE REVIEW, REHAB EVAL
ACTIVITY ORDER: OOB with Assistance; Spoke with SERENITY Philip prior to PT evaluation-->Pt OK for PT consult/OOB activity./yes

## 2025-03-17 NOTE — OCCUPATIONAL THERAPY INITIAL EVALUATION ADULT - DIAGNOSIS, OT EVAL
Pt with impaired cognition and balance impacting ability to complete ADLs, IADLs, functional mobility/transfers.

## 2025-03-17 NOTE — PHYSICAL THERAPY INITIAL EVALUATION ADULT - ACTIVE RANGE OF MOTION EXAMINATION, REHAB EVAL
jasper. upper extremity Active ROM was WNL (within normal limits)/bilateral lower extremity Active ROM was WNL (within normal limits)

## 2025-03-17 NOTE — OCCUPATIONAL THERAPY INITIAL EVALUATION ADULT - SHORT TERM MEMORY, REHAB EVAL
Brief Interview for Mental Status Score (BIMS) difficult to assess at this time due to pt remaining minimally verbal throughout session and with impaired/inconsistent command follow/impaired

## 2025-03-17 NOTE — PHYSICAL THERAPY INITIAL EVALUATION ADULT - PERTINENT HX OF CURRENT PROBLEM, REHAB EVAL
89F with PMH HTN, HLD, moderate/severe dementia who was  brought in to the ER due to an episode of loss of unresponsiveness transient right-sided facial droop now admitted for CVA workup. CT BRAIN IMPRESSION: No acute intracranial hemorrhage, mass effect, or midline shift. CT PERFUSION: Technical limitations: Perfusion scan was not diagnostic. CTA NECK: No evidence of significant stenosis or occlusion. CTA HEAD: No large vessel occlusion, significant stenosis or vascular abnormality identified.

## 2025-03-17 NOTE — PROGRESS NOTE ADULT - PROBLEM SELECTOR PLAN 1
-pt noted to have transient unresponsiveness at home w/ R sided facial droop that resolved within appx. 5 minutes.  -no seizure like activity or tongue biting noted, patient noted to return to baseline upon EMS arrival to [place]  -[facility] [place] called in ED, neuro consulted, CT imaging negative and labs/UA/CXR unremarkable  -will give atorvastatin and obtain further labs as per neuro  -TTE and MR brain w/wo contrast  -fall precautions, PT/OT/DVT ppx  -dysphagia screen pt noted to have transient unresponsiveness at home w/ R sided facial droop that resolved within appx. 5 minutes.  - no seizure like activity or tongue biting noted, patient noted to return to baseline upon EMS arrival to [place]  - code stroke called in ED, neuro consulted, CT imaging negative and labs/UA/CXR unremarkable  - appreciate neuro recs: MRI/TTE/EEG pending  - continue aspirin, atorvastatin  - PT evaluation

## 2025-03-17 NOTE — OCCUPATIONAL THERAPY INITIAL EVALUATION ADULT - ADDITIONAL COMMENTS
Pt with impaired cognitive status; social history obtained from pt's daughter present at bedside. Pt lives in a private house with 4 steps to enter and a full flight inside the home to pt's bedroom with her daughter. Pt's bathroom has a tub with a shower chair. Pt was independent with all ADLs (requiring supervision for bathing) and ambulating "occasionally" with a cane but mostly without a device prior to admission.   Pt left supine in bed in NAD, all lines intact,  (+) alarm, call bell in reach, RN aware.

## 2025-03-17 NOTE — OCCUPATIONAL THERAPY INITIAL EVALUATION ADULT - PERTINENT HX OF CURRENT PROBLEM, REHAB EVAL
As per H&P: "89yo F with PMHx significant for HTN, HLD, moderate to severe dementia was brought in to the ER due to an episode of loss of unresponsiveness transient right-sided facial droop.  The patient was a poor historian collateral history was taken from the daughter and the granddaughter at the bedside.  As per the family she was at her usual state of health around 1 PM today (March 16) then in between 1-1 30 she was washings something in the kitchen while standing, pain her granddaughter went to check on her and found her leaning towards the right side.  Then she started to fall towards the right side and the other family members came in and hold her and put her on the floor, she was not responding to her name calling only making some grunting noises.  She had urinary incontinence during that episode. This episode of unresponsiveness lasted for about 5 minutes (?),  There was no shaking of the limbs or any twitching of the muscles the body, no tongue bite.  Exam grossly non-focal, CT head and CTA head/neck unremarkable. "    Admit dx: Convulsions, code stroke

## 2025-03-17 NOTE — PROGRESS NOTE ADULT - ASSESSMENT
89F with PMH HTN, HLD, moderate/severe dementia who was  brought in to the ER due to an episode of loss of unresponsiveness transient right-sided facial droop now admitted for CVA workup 89F with hx of dementia, HTN, HLD who presents after episode of loss of unresponsiveness transient right-sided facial droop, admitted for CVA workup.

## 2025-03-17 NOTE — OCCUPATIONAL THERAPY INITIAL EVALUATION ADULT - NS ASR FOLLOW COMMAND OT EVAL
Inconsistent command follow. Pt remained minimally verbal/50% of the time/able to follow single-step instructions

## 2025-03-17 NOTE — PATIENT PROFILE ADULT - NSPRESCRALCFREQ_GEN_A_NUR
[FreeTextEntry1] : Arthralgias of BL hands\par PE with no significant tenderness or synovitis\par \par Prior labs reviewed\par \par -NSAIDS/Tylenol prn for pain\par -trial with gabapentin\par -consider EMG/NCS\par \par Discussed treatment plan with the patient. The patient was given the opportunity to ask questions and all questions were answered to their satisfaction.\par 
Never

## 2025-03-17 NOTE — PHYSICAL THERAPY INITIAL EVALUATION ADULT - ADDITIONAL COMMENTS
Pt lives in a private house with her daughter with 4 steps to enter; (+)bilateral handrails; and a flight of stairs to negotiate to bedroom (~12 steps); (+)handrail. Prior to hospital admission, pt was ambulating independently using either no assistive device, single axis cane PRN, or holding onto furniture. Pt has had no recent falls, however pt's daughter states that they did have lower pt to floor yesterday as she was leaning towards right side.    Pt left comfortable in bed, NAD, all lines intact, all precautions maintained, with call bell  in reach, bed alarm on, daughter at bedside, and SERENITY Smith aware.

## 2025-03-17 NOTE — PHYSICAL THERAPY INITIAL EVALUATION ADULT - ORIENTATION, REHAB EVAL
Plastic Surgeon Procedure Text (A): After obtaining clear surgical margins the patient was sent to plastics for surgical repair.  The patient understands they will receive post-surgical care and follow-up from the referring physician's office. person/place

## 2025-03-18 ENCOUNTER — TRANSCRIPTION ENCOUNTER (OUTPATIENT)
Age: 89
End: 2025-03-18

## 2025-03-18 ENCOUNTER — RESULT REVIEW (OUTPATIENT)
Age: 89
End: 2025-03-18

## 2025-03-18 VITALS
OXYGEN SATURATION: 100 % | TEMPERATURE: 98 F | DIASTOLIC BLOOD PRESSURE: 95 MMHG | HEART RATE: 63 BPM | SYSTOLIC BLOOD PRESSURE: 146 MMHG | RESPIRATION RATE: 18 BRPM

## 2025-03-18 DIAGNOSIS — I50.32 CHRONIC DIASTOLIC (CONGESTIVE) HEART FAILURE: ICD-10-CM

## 2025-03-18 PROCEDURE — 93306 TTE W/DOPPLER COMPLETE: CPT | Mod: 26

## 2025-03-18 PROCEDURE — 99239 HOSP IP/OBS DSCHRG MGMT >30: CPT

## 2025-03-18 RX ORDER — LOSARTAN POTASSIUM 100 MG/1
1 TABLET, FILM COATED ORAL
Qty: 0 | Refills: 0 | DISCHARGE

## 2025-03-18 RX ORDER — AMLODIPINE BESYLATE 10 MG/1
5 TABLET ORAL DAILY
Refills: 0 | Status: DISCONTINUED | OUTPATIENT
Start: 2025-03-18 | End: 2025-03-18

## 2025-03-18 RX ADMIN — ENOXAPARIN SODIUM 40 MILLIGRAM(S): 100 INJECTION SUBCUTANEOUS at 10:36

## 2025-03-18 RX ADMIN — Medication 81 MILLIGRAM(S): at 12:04

## 2025-03-18 RX ADMIN — LOSARTAN POTASSIUM 100 MILLIGRAM(S): 100 TABLET, FILM COATED ORAL at 10:35

## 2025-03-18 NOTE — PROGRESS NOTE ADULT - PROBLEM SELECTOR PLAN 6
DVT ppx: lovenox  DIET: DASH  DISPO: Home. 37 mins spent dc planning. Discussed with daughter at bedside
Diet: NPO pending dysphagia screen, can restart DASH diet if passes  DVT: lovenox  Dispo: pending

## 2025-03-18 NOTE — DISCHARGE NOTE NURSING/CASE MANAGEMENT/SOCIAL WORK - FINANCIAL ASSISTANCE
[facility] provides services at a reduced cost to those who are determined to be eligible through [facility]’s financial assistance program. Information regarding [facility]’s financial assistance program can be found by going to [email or web address] or by calling [phone].

## 2025-03-18 NOTE — DISCHARGE NOTE PROVIDER - CARE PROVIDER_API CALL
Parag León  [facility]  [address] [place] [address]  Phone: [phone]  Fax: [phone]  Follow Up Time:    Parag León  Family Medicine  [address] [place] [address]  Phone: [phone]  Fax: [phone]  Follow Up Time:     Silvia Gomez  Internal Medicine  [address] [place] [address]  Phone: [phone]  Fax: [phone]  Follow Up Time:

## 2025-03-18 NOTE — DISCHARGE NOTE PROVIDER - HOSPITAL COURSE
89F with hx of dementia, HTN, HLD who presents after episode of loss of unresponsiveness transient right-sided facial droop, admitted for CVA workup. Patient was seen by neurology, suspect syncope vs. seizure. EEG was negative. TTE showed _______. Stable for discharge with outpatient followup. PT recommended home. 89F with hx of dementia, HTN, HLD who presents after episode of loss of unresponsiveness transient right-sided facial droop, admitted for CVA workup. Patient was seen by neurology, suspect syncope vs. seizure. EEG was negative. TTE showed mild diastolic dysfunction. Stable for discharge with outpatient followup. PT recommended home.     Transient loss of consciousness

## 2025-03-18 NOTE — DISCHARGE NOTE PROVIDER - NSDCCPTREATMENT_GEN_ALL_CORE_FT
PRINCIPAL PROCEDURE  Procedure: 2D echo  Findings and Treatment:   < end of copied text >  CONCLUSIONS:      1. Left ventricular cavity is normal in size. Left ventricular wall thickness is normal. Left ventricular systolic function is normal with an ejection fraction visually estimated at 65 to 70 %. There are no regional wall motion abnormalities seen.   2. There is mild (grade 1) left ventricular diastolic dysfunction.   3. Normal right ventricular cavity size and normal right ventricular systolic function. Tricuspid annular plane systolic excursion (TAPSE) is 2.1 cm (normal >=1.7 cm).   4. Structurally normal mitral valve with normal leaflet excursion. There is calcification of the mitral valve annulus. There is mild mitral regurgitation.< from: TTE W or WO Ultrasound Enhancing Agent (03.18.25 @ 13:01) >

## 2025-03-18 NOTE — DISCHARGE NOTE PROVIDER - PROVIDER TOKENS
PROVIDER:[TOKEN:[[id number]]] PROVIDER:[TOKEN:[[id number]]],PROVIDER:[TOKEN:[[id number]]]

## 2025-03-18 NOTE — DISCHARGE NOTE NURSING/CASE MANAGEMENT/SOCIAL WORK - NSDCPEFALRISK_GEN_ALL_CORE
For information on Fall & Injury Prevention, visit: [email or web address] OR  [email or web address] OR  https://www.cdc.gov/steadi/patient.html

## 2025-03-18 NOTE — PROGRESS NOTE ADULT - PROBLEM SELECTOR PLAN 1
pt noted to have transient unresponsiveness at home w/ R sided facial droop that resolved within appx. 5 minutes.  - no seizure like activity or tongue biting noted, patient noted to return to baseline upon EMS arrival to [place]  - code stroke called in ED, neuro consulted, CT imaging negative and labs/UA/CXR unremarkable  - appreciate neuro recs: suspect seizure vs. syncope, no need for MRI, follow up TTE/EEG  - EEG with no epileptiform activity seen - d/w neuro attg Dr. Machado ok for dc  - TTE with EF 65-70% - mild diastolic dysfunction - outpatient cardiology followup  - continue aspirin, atorvastatin  - PT recommended home

## 2025-03-18 NOTE — EEG REPORT - NS EEG TEXT BOX
[facility]   COMPREHENSIVE EPILEPSY CENTER   REPORT OF LONG-TERM VIDEO EEG     [facility]: [address], [place] [address], Ph#: [phone]  [facility] [address] [address] [place] [id number], Ph#: [phone]  [facility]: [address] [place] [address], Ph#: [phone]    Patient Name: BAKARI GAUTHIER  Age and : 89y ([birth year])  MRN #: [id number]  Location: [facility] [address]  Referring Physician: Kellee Couch    Start Time/Date: 12:32 on 3/17/2025  End Time/Date: 2:42 on 3/18/2025 (uninterpretable due to excess electrode artifact)  Duration: 14 hours 10 minutes    _____________________________________________________________  STUDY INFORMATION    EEG Recording Technique:  The patient underwent continuous Video-EEG monitoring, using Telemetry System hardware on the XLTek Digital System. EEG and video data were stored on a computer hard drive with important events saved in digital archive files. The material was reviewed by a physician (electroencephalographer / epileptologist) on a daily basis. Jose and seizure detection algorithms were utilized and reviewed. An EEG Technician attended to the patient, and was available throughout daytime work hours.  The epilepsy center neurologist was available in person or on call 24-hours per day.    EEG Placement and Labeling of Electrodes:  The EEG was performed utilizing 20 channel referential EEG connections (coronal over temporal over parasagittal montage) using all standard 10-20 electrode placements with EKG, with additional electrodes placed in the inferior temporal region using the modified 10-10 montage electrode placements for elective admissions, or if deemed necessary. Recording was at a sampling rate of 256 samples per second per channel. Time synchronized digital video recording was done simultaneously with EEG recording. A low light infrared camera was used for low light recording.     _____________________________________________________________  HISTORY    Patient is a 89y old  Female who presents with a chief complaint of R/o Stroke (17 Mar 2025 16:36)      PERTINENT MEDICATION:  MEDICATIONS  (STANDING):  aspirin enteric coated 81 milliGRAM(s) Oral daily  enoxaparin Injectable 40 milliGRAM(s) SubCutaneous every 24 hours  losartan 100 milliGRAM(s) Oral daily  rosuvastatin 40 milliGRAM(s) Oral at bedtime    _____________________________________________________________  STUDY INTERPRETATION      FINDINGS:      Background:  Continuity: continuous  Symmetry: symmetric  PDR: 7-8 Hz activity, with amplitude to 40 uV, that attenuated to eye opening.  Low amplitude frontal beta noted in wakefulness.  Reactivity: present  Voltage: normal, mostly 20-150uV  Anterior Posterior Gradient: present  Other background findings: none  Breach: absent    Background Slowing:  Generalized slowing: mild-moderate, with mixed alpha, theta, and delta activity during maximal wakefulness.  Focal slowing: intermittent left and right temporal delta activity.    State Changes:   -Drowsiness noted with increased slowing, attenuation of fast activity, vertex transients.  -Present with N2 sleep transients with symmetric spindles and K-complexes.    Sporadic Epileptiform Discharges:    None    Rhythmic and Periodic Patterns (RPPs):  None     Electrographic and Electroclinical seizures:  None    Other Clinical Events:  None    Activation Procedures:   -Hyperventilation was not performed.    -Photic stimulation was not performed.    Artifacts:  Intermittent myogenic and movement artifacts were noted.    ECG:  The heart rate on single channel ECG was uninterpretable.    Summary:  Abnormal EEG in the awake / drowsy / asleep state(s).  Mild-moderate generalized background slowing with superimposed bitemporal focal slowing.    Clinical Impression:  Diffuse cerebral dysfunction, maximal in the bilateral temporal regions.  There were no epileptiform abnormalities recorded.       -------------------------------------------------------------------------------------------------------    Maria T Hernandez MD  Director, Epilepsy - [facility]    Questions please call [phone]  After hours (5 PM - 8:30 AM) please call the epilepsy answering service at [phone]

## 2025-03-18 NOTE — DISCHARGE NOTE PROVIDER - NSDCFUADDAPPT_GEN_ALL_CORE_FT
APPTS ARE READY TO BE MADE: [X] YES  Best Family or Patient Contact (if needed):  Additional Information if needed:  [facility]      Other comments or requests:  APPTS ARE READY TO BE MADE: [X] YES  Best Family or Patient Contact (if needed):  Additional Information if needed:  1. Dr. Silvia Gomez Telehealth      Other comments or requests:       [facility] - Appointment was scheduled in [facility] for 3/25/25 at 10:40am with Dr. Silvia Gomez as TEB.    PCP - Patients daughter informed us they already have secured a follow up appointment which is not visible on [facility] and declined to provide appointment details.

## 2025-03-18 NOTE — DISCHARGE NOTE NURSING/CASE MANAGEMENT/SOCIAL WORK - PATIENT PORTAL LINK FT
You can access the FollowMyHealth Patient Portal offered by [facility] by registering at the following website: [email or web address]. By joining [facility]’s FollowMyHealth portal, you will also be able to view your health information using other applications (apps) compatible with our system.

## 2025-03-18 NOTE — PROGRESS NOTE ADULT - SUBJECTIVE AND OBJECTIVE BOX
Kellee Couch MD  [facility] Division of Hospital Medicine  Pager [phone] (M-F 8AM-5PM)  Other Times: [phone]    Patient is a 89y old  Female who presents with a chief complaint of R/o Stroke (18 Mar 2025 12:51)    SUBJECTIVE / OVERNIGHT EVENTS: no acute events overnight     MEDICATIONS  (STANDING):  aspirin enteric coated 81 milliGRAM(s) Oral daily  enoxaparin Injectable 40 milliGRAM(s) SubCutaneous every 24 hours  losartan 100 milliGRAM(s) Oral daily  rosuvastatin 40 milliGRAM(s) Oral at bedtime    MEDICATIONS  (PRN):      PHYSICAL EXAM:  Vital Signs Last 24 Hrs  T(C): 36.8 (18 Mar 2025 09:14), Max: 36.9 (17 Mar 2025 16:20)  T(F): 98.2 (18 Mar 2025 09:14), Max: 98.4 (17 Mar 2025 16:20)  HR: 63 (18 Mar 2025 09:14) (60 - 77)  BP: 146/95 (18 Mar 2025 09:14) (144/79 - 161/91)  BP(mean): --  RR: 18 (18 Mar 2025 09:14) (17 - 18)  SpO2: 100% (18 Mar 2025 09:14) (100% - 100%)    Parameters below as of 18 Mar 2025 09:14  Patient On (Oxygen Delivery Method): room air        CONSTITUTIONAL: resting in bed comfortably   RESPIRATORY: Normal respiratory effort; lungs are clear to auscultation bilaterally  CARDIOVASCULAR: Regular rate and rhythm, normal S1 and S2, no murmur/rub/gallop; No lower extremity edema  GASTROINTESTINAL: Nontender to palpation, normoactive bowel sounds, no rebound/guarding; No hepatosplenomegaly  MUSCULOSKELETAL:  no clubbing or cyanosis of digits; no joint swelling or tenderness to palpation  NEUROLOGY: non-focal; no gross sensory deficits   PSYCH: A+O to person, place, and time; affect appropriate  SKIN: No rashes; warm     LABS:                        14.1   9.03  )-----------( 203      ( 16 Mar 2025 16:18 )             42.2     03-17    135  |  101  |  23  ----------------------------<  91  5.3   |  20[L]  |  0.85    Ca    9.6      17 Mar 2025 06:37    TPro  7.1  /  Alb  4.0  /  TBili  0.4  /  DBili  x   /  AST  37[H]  /  ALT  20  /  AlkPhos  60  03-16    PT/INR - ( 17 Mar 2025 06:37 )   PT: 11.5 sec;   INR: 0.97 ratio         PTT - ( 17 Mar 2025 06:37 )  PTT:26.0 sec      Urinalysis Basic - ( 17 Mar 2025 06:37 )    Color: x / Appearance: x / SG: x / pH: x  Gluc: 91 mg/dL / Ketone: x  / Bili: x / Urobili: x   Blood: x / Protein: x / Nitrite: x   Leuk Esterase: x / RBC: x / WBC x   Sq Epi: x / Non Sq Epi: x / Bacteria: x        Culture - Urine (collected 16 Mar 2025 16:00)  Source: Clean Catch  Final Report (17 Mar 2025 21:21):    <10,000 CFU/mL Normal Urogenital Erika    Urinalysis with Rflx Culture (collected 16 Mar 2025 16:00)        RADIOLOGY & ADDITIONAL TESTS:  Results Reviewed:   Imaging Personally Reviewed:  Electrocardiogram Personally Reviewed:    COORDINATION OF CARE:  Care Discussed with Consultants/Other Providers [Y/N]:  Prior or Outpatient Records Reviewed [Y/N]:  
Kellee Couch MD  [facility] Division of Hospital Medicine  Pager [phone] (M-F 8AM-5PM)  Other Times: [phone]    Patient is a 89y old  Female who presents with a chief complaint of R/o Stroke (16 Mar 2025 20:48)    SUBJECTIVE / OVERNIGHT EVENTS: no acute events overnight     MEDICATIONS  (STANDING):  aspirin enteric coated 81 milliGRAM(s) Oral daily  enoxaparin Injectable 40 milliGRAM(s) SubCutaneous every 24 hours  losartan 100 milliGRAM(s) Oral daily  rosuvastatin 40 milliGRAM(s) Oral at bedtime    MEDICATIONS  (PRN):      PHYSICAL EXAM:  Vital Signs Last 24 Hrs  T(C): 36.9 (17 Mar 2025 16:20), Max: 36.9 (17 Mar 2025 16:20)  T(F): 98.4 (17 Mar 2025 16:20), Max: 98.4 (17 Mar 2025 16:20)  HR: 60 (17 Mar 2025 16:20) (60 - 72)  BP: 144/79 (17 Mar 2025 16:20) (143/60 - 160/140)  BP(mean): --  RR: 17 (17 Mar 2025 16:20) (16 - 18)  SpO2: 100% (17 Mar 2025 16:20) (99% - 100%)    Parameters below as of 17 Mar 2025 16:20  Patient On (Oxygen Delivery Method): room air        CONSTITUTIONAL: resting in bed comfortably   RESPIRATORY: Normal respiratory effort; lungs are clear to auscultation bilaterally  CARDIOVASCULAR: Regular rate and rhythm, normal S1 and S2, no murmur/rub/gallop; No lower extremity edema  GASTROINTESTINAL: Nontender to palpation, normoactive bowel sounds, no rebound/guarding; No hepatosplenomegaly  MUSCULOSKELETAL:  no clubbing or cyanosis of digits; no joint swelling or tenderness to palpation  NEUROLOGY: non-focal; no gross sensory deficits   PSYCH: A+O to person, place, and time; affect appropriate  SKIN: No rashes; warm     LABS:                        14.1   9.03  )-----------( 203      ( 16 Mar 2025 16:18 )             42.2     03-17    135  |  101  |  23  ----------------------------<  91  5.3   |  20[L]  |  0.85    Ca    9.6      17 Mar 2025 06:37    TPro  7.1  /  Alb  4.0  /  TBili  0.4  /  DBili  x   /  AST  37[H]  /  ALT  20  /  AlkPhos  60  03-16    PT/INR - ( 17 Mar 2025 06:37 )   PT: 11.5 sec;   INR: 0.97 ratio         PTT - ( 17 Mar 2025 06:37 )  PTT:26.0 sec      Urinalysis Basic - ( 17 Mar 2025 06:37 )    Color: x / Appearance: x / SG: x / pH: x  Gluc: 91 mg/dL / Ketone: x  / Bili: x / Urobili: x   Blood: x / Protein: x / Nitrite: x   Leuk Esterase: x / RBC: x / WBC x   Sq Epi: x / Non Sq Epi: x / Bacteria: x        Urinalysis with Rflx Culture (collected 16 Mar 2025 16:00)        RADIOLOGY & ADDITIONAL TESTS:  Results Reviewed:   Imaging Personally Reviewed:  Electrocardiogram Personally Reviewed:    COORDINATION OF CARE:  Care Discussed with Consultants/Other Providers [Y/N]:  Prior or Outpatient Records Reviewed [Y/N]:

## 2025-03-18 NOTE — DISCHARGE NOTE PROVIDER - NSDCMRMEDTOKEN_GEN_ALL_CORE_FT
calcium-vitamin D:   cyanocobalamin:   losartan 100 mg oral tablet: 1 tab(s) orally once a day  rosuvastatin 5 mg oral tablet: 1 tab(s) orally once a day (at bedtime)  thiamine:    calcium-vitamin D:   cyanocobalamin:   losartan 100 mg oral tablet: 1 tab(s) orally once a day  outpatient physical therapy: 3 times a week  rosuvastatin 5 mg oral tablet: 1 tab(s) orally once a day (at bedtime)  thiamine:

## 2025-03-18 NOTE — PROGRESS NOTE ADULT - PROBLEM SELECTOR PLAN 2
euvolemic  - TTE with EF 65-70% - mild diastolic dysfunction - outpatient cardiology followup  - continue losartan, hold off BB given age/HR in 60s

## 2025-03-18 NOTE — PROGRESS NOTE ADULT - ASSESSMENT
89F with hx of dementia, HTN, HLD who presents after episode of loss of unresponsiveness transient right-sided facial droop, admitted for neuro workup.

## 2025-03-18 NOTE — DISCHARGE NOTE PROVIDER - NSDCCPCAREPLAN_GEN_ALL_CORE_FT
PRINCIPAL DISCHARGE DIAGNOSIS  Diagnosis: Transient unconsciousness  Assessment and Plan of Treatment: You were seen by neurology. You had an EEG done which did not show seizure. Echo of your heart was done as well. Our neurology team did not think you needed an MRI in the hospital. Please follow up with your neurologist to see if you need to pursue getting an MRI Brain with and without contrast outpatient.     PRINCIPAL DISCHARGE DIAGNOSIS  Diagnosis: Transient unconsciousness  Assessment and Plan of Treatment: You were seen by neurology. You had an EEG done which did not show seizure. Echo of your heart was done as well which showed very mild dysfunction - you can follow up with cardiology. Our neurology team did not think you needed an MRI in the hospital. Please follow up with your neurologist to see if you need to pursue getting an MRI Brain with and without contrast outpatient.

## 2025-03-20 LAB — VIT B1 SERPL-MCNC: 153.8 NMOL/L — SIGNIFICANT CHANGE UP (ref 66.5–200)

## 2025-03-24 LAB — PYRIDOXAL PHOS SERPL-MCNC: 7.2 UG/L — SIGNIFICANT CHANGE UP (ref 3.4–65.2)

## 2025-03-25 ENCOUNTER — APPOINTMENT (OUTPATIENT)
Dept: INTERNAL MEDICINE | Facility: CLINIC | Age: 89
End: 2025-03-25
Payer: MEDICARE

## 2025-03-25 DIAGNOSIS — I10 ESSENTIAL (PRIMARY) HYPERTENSION: ICD-10-CM

## 2025-03-25 DIAGNOSIS — R55 SYNCOPE AND COLLAPSE: ICD-10-CM

## 2025-03-25 PROCEDURE — 99495 TRANSJ CARE MGMT MOD F2F 14D: CPT | Mod: 2W
